# Patient Record
Sex: FEMALE | Race: WHITE | NOT HISPANIC OR LATINO | Employment: FULL TIME | URBAN - METROPOLITAN AREA
[De-identification: names, ages, dates, MRNs, and addresses within clinical notes are randomized per-mention and may not be internally consistent; named-entity substitution may affect disease eponyms.]

---

## 2017-10-05 ENCOUNTER — GENERIC CONVERSION - ENCOUNTER (OUTPATIENT)
Dept: OTHER | Facility: OTHER | Age: 22
End: 2017-10-05

## 2017-11-24 ENCOUNTER — ALLSCRIPTS OFFICE VISIT (OUTPATIENT)
Dept: OTHER | Facility: OTHER | Age: 22
End: 2017-11-24

## 2017-11-25 NOTE — PROGRESS NOTES
Assessment    1  Encounter for annual routine gynecological examination (V72 31) (Z01 419)   2  Screening for STDs (sexually transmitted diseases) (V74 5) (Z11 3)   3  Contraception management (V25 9) (Z30 9)    Plan  Contraception management    · NuvaRing 0 12-0 015 MG/24HR Vaginal Ring; INSERT 1 RING VAGINALLY FOR3 WEEKS THEN 1 WEEK OFF   Rx By: Neeta Wong; Dispense: 0 Days ; #:1 X 1 Ring Box; Refill: 11; Contraception management; SHANE = N; Verified Transmission to [id number]; Last Updated By: [employer]; 11/24/2017 2:01:16 PM  Screening for STDs (sexually transmitted diseases)    · (Q) CHLAMYDIA/N  GONORRHOEAE DNA, SDA; Status:Active; Requestedfor:24Nov2017;    Perform:[employer]; WUP:[id number]; Ordered;for STDs (sexually transmitted diseases); Ordered By:Kerline Kim;    Discussion/Summary  cervical cancer screening is current Testing was done today for chlamydia and gonorrhea  Breast cancer screening: the risks and benefits of breast cancer screening were discussed, self breast exam technique was taught and monthly self breast exam was advised  Advice and education were given regarding aerobic exercise and weight bearing exercise  Contraception-discussed various options including birth control pills, the patch, Nexplanon, Depo-Provera  She is interested in C/ [place] 9  The pamphlet was given for her to review and also I gave her a pamphlet on NuvaRing and highlighted the instructions  She will restart NuvaRing with her next period, she will use condoms if she is sexually active and she will call us if she wants to proceed with Nexplanon  and gonorrhea done today  The patient has the current Goals: See discussion  The patent has the current Barriers: None  Patient is able to Self-Care  Possible side effects of new medications were reviewed with the patient/guardian today  The treatment plan was reviewed with the patient/guardian   The patient/guardian understands and agrees with the treatment plan      Chief Complaint    1  Visit For: Preventive General Multisystem Exam    History of Present Illness  HPI: Pt here for yearly, she has been on NuvaRing  This past month she forgot to reinsert her new ring because she was very busy with school  She has not been sexually active this semester  Last year she was inserting her new for ring late because she was waiting for her menstrual cycle to end, I reviewed the correct use at that time  She is interested in another form of contraception because occasionally the NuvaRing falls out when she lifts weights or does other strenuous exercise  She was also treated for Chlamydia last year and had a normal test of cure  GYN HM, Adult Female [facility], [place]: The patient is being seen for a health maintenance evaluation  The last health maintenance visit was 1 year(s) ago  General Health: The patient's health since the last visit is described as good  Lifestyle:  She does not have any weight concerns  -- She exercises regularly  -- She does not use tobacco   Reproductive health: the patient is premenopausal--   she reports no menstrual problems  -- she uses contraception  For contraception, she uses the intravaginal ring -- she is not sexually active  -- she denies prior pregnancies  Screening:      Review of Systems   Constitutional: No fever, no chills, feels well, no tiredness, no recent weight gain or loss  ENT: no ear ache, no loss of hearing, no nosebleeds or nasal discharge, no sore throat or hoarseness  Cardiovascular: no complaints of slow or fast heart rate, no chest pain, no palpitations, no leg claudication or lower extremity edema  Respiratory: no complaints of shortness of breath, no wheezing, no dyspnea on exertion, no orthopnea or PND  Breasts: no complaints of breast pain, breast lump or nipple discharge  Gastrointestinal: no complaints of abdominal pain, no constipation, no nausea or diarrhea, no vomiting, no bloody stools    Genitourinary: no complaints of dysuria, no incontinence, no pelvic pain, no dysmenorrhea, no vaginal discharge or abnormal vaginal bleeding  Musculoskeletal: no complaints of arthralgia, no myalgia, no joint swelling or stiffness, no limb pain or swelling  Integumentary: no complaints of skin rash or lesion, no itching or dry skin, no skin wounds  Neurological: no complaints of headache, no confusion, no numbness or tingling, no dizziness or fainting  Active Problems  1  Chlamydia (079 98) (A74 9)   2  Contraception management (V2 9) (Z30 9)   3  Encounter for annual routine gynecological examination () (Z01 419)   4  Encounter for initial prescription of contraceptives () (Z30 019)   5  Denied: History of self breast exam   6  Oral contraceptive pill surveillance () (Z30 41)   7  Screening for STDs (sexually transmitted diseases) (V74 5) (Z11 3)    Past Medical History   · History of  0 (V49 89)   · Denied: History of self breast exam    The active problems and past medical history were reviewed and updated today  Surgical History   · History of Oral Surgery Tooth Extraction [place] To[facility]    The surgical history was reviewed and updated today  Family History  Mother    · No pertinent family history  Father    · No pertinent family history    The family history was reviewed and updated today  Social History     · Always uses seat belt   · Caffeine use (V49 89) (F15 90)   · Has no children   · Never a smoker   · No drug use   · Faith   · Single   · Social alcohol use (Z78 9)  The social history was reviewed and updated today  Current Meds   1  Detrol 1 MG Oral Tablet; Therapy: (Recorded:2017) to Recorded   2  NuvaRing 0 12-0 015 MG/24HR Vaginal Ring; INSERT 1 RING VAGINALLY FOR 3 WEEKS THEN 1 WEEK OFF; Therapy: [id number] to (Evaluate:58Fmo8409)  Requested for: 54MNI6577; Last Rx:2017 Ordered    Allergies  1   Augmentin TABS    Vitals   Recorded: [id number] 01:25PM Systolic 001, LUE, Sitting   Diastolic 62, LUE, Sitting   Height 4 ft 4 5 in   Weight 113 lb 6 oz   BMI Calculated 28 92   BSA Calculated 1 33   LMP [id number]       Physical Exam   Constitutional  General appearance: No acute distress, well appearing and well nourished  Neck  Thyroid: Normal, no thyromegaly  Pulmonary  Auscultation of lungs: Clear to auscultation  Cardiovascular  Auscultation of heart: Normal rate and rhythm, normal S1 and S2, no murmurs  Genitourinary  External genitalia: Normal and no lesions appreciated  Vagina: Normal, no lesions or dryness appreciated  Urethra: Normal    Urethral meatus: Normal    Bladder: Normal, soft, non-tender and no prolapse or masses appreciated  Cervix: Normal, no palpable masses  Uterus: Normal, non-tender, not enlarged, and no palpable masses  Adnexa/parametria: Normal, non-tender and no fullness or masses appreciated  Chest  Breasts: Normal and no dimpling or skin changes noted  Abdomen  Abdomen: Normal, non-tender, and no organomegaly noted  Liver and spleen: No hepatomegaly or splenomegaly  Examination for hernias: No hernias appreciated  Psychiatric  Orientation to person, place, and time: Normal    Mood and affect: Normal        Signatures   Electronically signed by :  Blayne Anderson DO; Nov 24 2017  3:19PM EST                       (Author)

## 2017-11-27 LAB — CLINICAL COMMENT (HISTORICAL): NORMAL

## 2017-11-29 ENCOUNTER — GENERIC CONVERSION - ENCOUNTER (OUTPATIENT)
Dept: OTHER | Facility: OTHER | Age: 22
End: 2017-11-29

## 2018-01-09 NOTE — MISCELLANEOUS
Message   Recorded as Task   Date: 10/17/2016 10:37 AM, Created By: Shelly Bobo   Task Name: Review Document   Assigned To: Delaney Hassan   Regarding Patient: Michael Marrero, Status: In Progress   Comment:    Shelly Bobo - 17 Oct 2016 10:37 AM     TASK CREATED  pT WITH + CHLAMYDIA TEST, treat with 1gm azithromycin and then come back for repeat test, her partner needs to be treated also  Effie Menjivar - 18 Oct 2016 12:20 PM     TASK IN PROGRESS        Active Problems    1  Encounter for annual routine gynecological examination (V72 31) (Z01 419)   2  Encounter for initial prescription of contraceptives (V25 02) (Z30 019)   3  Denied: History of self breast exam   4  Oral contraceptive pill surveillance (V25 41) (Z30 41)   5  Screening for STDs (sexually transmitted diseases) (V74 5) (Z11 3)    Current Meds   1  NuvaRing 0 12-0 015 MG/24HR Vaginal Ring; INSERT 1 RING VAGINALLY FOR 3   WEEKS THEN 1 WEEK OFF; Therapy: [id number] to (Last Rx:07Oct2016)  Requested for: 07Oct2016 Ordered    Allergies    1   Augmentin TABS    Signatures   Electronically signed by : Ishan Huber, ; Oct 20 2016  8:53AM EST                       (Author)

## 2018-01-12 NOTE — MISCELLANEOUS
Message   Date: 05 Oct 2017 3:49 PM EST, Recorded By: Osmar Cai For: Chester Lino, Self   Phone: [phone] (Home)   Reason: Medical Complaint   pt has been having acute UTI's for 5 years  Georgette Ormond advised pt to see her PCP about this           Active Problems    1  Chlamydia (079 98) (A74 9)   2  Contraception management (V25 9) (Z30 9)   3  Encounter for annual routine gynecological examination (V72 31) (Z01 419)   4  Encounter for initial prescription of contraceptives (V25 02) (Z30 019)   5  Denied: History of self breast exam   6  Oral contraceptive pill surveillance (V25 41) (Z30 41)   7  Screening for STDs (sexually transmitted diseases) (V74 5) (Z11 3)    Current Meds   1  NuvaRing 0 12-0 015 MG/24HR Vaginal Ring; INSERT 1 RING VAGINALLY FOR 3   WEEKS THEN 1 WEEK OFF; Therapy: [id number] to (Last Rx:25Nov2016)  Requested for: 25Nov2016 Ordered    Allergies    1   Augmentin TABS    Plan  Contraception management    · NuvaRing 0 12-0 015 MG/24HR Vaginal Ring; INSERT 1 RING VAGINALLY FOR  3 WEEKS THEN 1 WEEK OFF    Signatures   Electronically signed by : Cally Alexis, ; Oct  5 2017  3:49PM EST                       (Author)

## 2018-01-13 NOTE — MISCELLANEOUS
Message   Recorded as Task   Date: 12/01/2016 08:39 AM, Created By: Toro Perez   Task Name: Review Document   Assigned To: Ivelisse Easley   Regarding Patient: Ching Donohue, Status: In Progress   Comment:    Toro Perez - 01 Dec 2016 8:39 AM     TASK CREATED  negative chlamydia and gonorrhea, please reinforce that she needs to inform her previous partner that she had chlamydia and he needs to be checked  Mira Sanchez - 01 Dec 2016 3:52 PM     TASK IN PROGRESS        Active Problems    1  Chlamydia (079 98) (A74 9)   2  Contraception management (V25 9) (Z30 9)   3  Encounter for annual routine gynecological examination (V72 31) (Z01 419)   4  Encounter for initial prescription of contraceptives (V25 02) (Z30 019)   5  Denied: History of self breast exam   6  Oral contraceptive pill surveillance (V25 41) (Z30 41)   7  Screening for STDs (sexually transmitted diseases) (V74 5) (Z11 3)    Current Meds   1  NuvaRing 0 12-0 015 MG/24HR Vaginal Ring; INSERT 1 RING VAGINALLY FOR 3   WEEKS THEN 1 WEEK OFF; Therapy: [id number] to (Last Rx:25Nov2016)  Requested for: 25Nov2016 Ordered    Allergies    1   Augmentin TABS    Signatures   Electronically signed by : Griselda Shiley, ; Dec 12 2016  9:52AM EST                       (Author)

## 2018-01-14 VITALS
DIASTOLIC BLOOD PRESSURE: 62 MMHG | SYSTOLIC BLOOD PRESSURE: 104 MMHG | HEIGHT: 55 IN | BODY MASS INDEX: 26.24 KG/M2 | WEIGHT: 113.38 LBS

## 2018-01-14 NOTE — MISCELLANEOUS
Message   Recorded as Task   Date: 11/28/2017 04:40 PM, Created By: Leif Manuel   Task Name: Go to Result   Assigned To: Mario Hendricks   Regarding Patient: Kristina Bronson, Status: Active   Comment:    Leif Manuel - 28 Nov 2017 4:40 PM     TASK CREATED  nml chlamydia and gonorrhea   Mira Sanchez - 29 Nov 2017 12:44 PM     TASK EDITED  LM on pts VM with results        Active Problems    1  Chlamydia (079 98) (A74 9)   2  Contraception management (V25 9) (Z30 9)   3  Encounter for annual routine gynecological examination (V72 31) (Z01 419)   4  Encounter for initial prescription of contraceptives (V25 02) (Z30 019)   5  Denied: History of self breast exam   6  Oral contraceptive pill surveillance (V25 41) (Z30 41)   7  Screening for STDs (sexually transmitted diseases) (V74 5) (Z11 3)    Current Meds   1  Detrol 1 MG Oral Tablet (Tolterodine Tartrate); Therapy: (Recorded:24Nov2017) to Recorded   2  NuvaRing 0 12-0 015 MG/24HR Vaginal Ring; INSERT 1 RING VAGINALLY FOR 3   WEEKS THEN 1 WEEK OFF; Therapy: [id number] to (Last Rx:24Nov2017)  Requested for: 63CYG7792 Ordered    Allergies    1   Augmentin TABS    Signatures   Electronically signed by : Kayla Lees, ; Nov 29 2017 12:45PM EST                       (Author)

## 2018-01-15 NOTE — MISCELLANEOUS
Message   Date: 24 Oct 2016 4:19 PM EST, Recorded By: Mis Zuniga For: Tony Alfonso Ma, Self   Phone: [phone] (Home)   Reason: Other   pt will scheule test of cure in 3- 4 weeks    script called in for the medication to [facility] [place]           Active Problems    1  Encounter for annual routine gynecological examination (V72 31) (Z01 419)   2  Encounter for initial prescription of contraceptives (V25 02) (Z30 019)   3  Denied: History of self breast exam   4  Oral contraceptive pill surveillance (V25 41) (Z30 41)   5  Screening for STDs (sexually transmitted diseases) (V74 5) (Z11 3)    Current Meds   1  NuvaRing 0 12-0 015 MG/24HR Vaginal Ring; INSERT 1 RING VAGINALLY FOR 3   WEEKS THEN 1 WEEK OFF; Therapy: [id number] to (Last Rx:07Oct2016)  Requested for: 07Oct2016 Ordered    Allergies    1   Augmentin TABS    Signatures   Electronically signed by : Kira Ortega, ; Oct 24 2016  4:20PM EST                       (Author)

## 2018-01-18 NOTE — MISCELLANEOUS
Message   Recorded as Task   Date: 08/19/2016 10:51 AM, Created By: Car Collier   Task Name: Miscellaneous   Assigned To: Effie Menjivar   Regarding Patient: Sherilyn Denver, Status: Active   CommentKris Gonzalez - 19 Aug 2016 10:51 AM     TASK CREATED  Patient called again and left more information  She is c/o ongoing irritation with Nuva Ring  She is thinking she wants Paragard but initially she wants an appointment for a birth control discussion  I will call her to schedule this  You may disregard the 2 tasks about her  Kristine Griffithnn - 19 Aug 2016 12:57 PM     TASK REPLIED TO: Previously Assigned To Kristine Aj  thanks        Active Problems    1  Encounter for annual routine gynecological examination (V72 31) (Z01 419)   2  Encounter for initial prescription of contraceptives (V25 02) (Z30 019)   3  Denied: History of self breast exam    Current Meds   1  NuvaRing 0 12-0 015 MG/24HR Vaginal Ring; INSERT 1 RING VAGINALLY FOR 3   WEEKS THEN 1 WEEK OFF; Therapy: [id number] to (Last Rx:[id number])  Requested for: [id number] Ordered    Allergies    1   Augmentin TABS    Signatures   Electronically signed by : Mercedes Carlisle, ; Aug 19 2016  3:12PM EST                       (Author)

## 2018-11-23 ENCOUNTER — ANNUAL EXAM (OUTPATIENT)
Dept: OBGYN CLINIC | Facility: CLINIC | Age: 23
End: 2018-11-23

## 2018-11-23 VITALS
BODY MASS INDEX: 23.09 KG/M2 | WEIGHT: 110 LBS | SYSTOLIC BLOOD PRESSURE: 110 MMHG | HEIGHT: 58 IN | DIASTOLIC BLOOD PRESSURE: 80 MMHG

## 2018-11-23 DIAGNOSIS — Z11.3 SCREENING FOR STD (SEXUALLY TRANSMITTED DISEASE): Primary | ICD-10-CM

## 2018-11-23 DIAGNOSIS — Z12.4 SCREENING FOR CERVICAL CANCER: ICD-10-CM

## 2018-11-23 DIAGNOSIS — Z01.419 ENCOUNTER FOR ANNUAL ROUTINE GYNECOLOGICAL EXAMINATION: ICD-10-CM

## 2018-11-23 DIAGNOSIS — Z30.016 ENCOUNTER FOR INITIAL PRESCRIPTION OF TRANSDERMAL PATCH HORMONAL CONTRACEPTIVE DEVICE: ICD-10-CM

## 2018-11-23 PROCEDURE — 99395 PREV VISIT EST AGE 18-39: CPT | Performed by: OBSTETRICS & GYNECOLOGY

## 2018-11-23 PROCEDURE — G0145 SCR C/V CYTO,THINLAYER,RESCR: HCPCS | Performed by: OBSTETRICS & GYNECOLOGY

## 2018-11-23 PROCEDURE — 87491 CHLMYD TRACH DNA AMP PROBE: CPT | Performed by: OBSTETRICS & GYNECOLOGY

## 2018-11-23 PROCEDURE — 87591 N.GONORRHOEAE DNA AMP PROB: CPT | Performed by: OBSTETRICS & GYNECOLOGY

## 2018-11-23 RX ORDER — TOLTERODINE TARTRATE 1 MG/1
TABLET, EXTENDED RELEASE ORAL
COMMUNITY

## 2018-11-23 NOTE — PROGRESS NOTES
Assessment/Plan:    Contraception - reviewed several options with her and also her mother  Discussed ocs, contraception patch, nuvaring, Nexplanon, Depo provera and IUD  She is considering nexplanon  She was given information on this to review  She will try the patch for now while she is deciding and will call if she wants Nexplanon  Pap, gc and chlamydia done today    No problem-specific Assessment & Plan notes found for this encounter  Diagnoses and all orders for this visit:    Screening for STD (sexually transmitted disease)  -     Chlamydia/GC amplified DNA by PCR    Encounter for annual routine gynecological examination    Encounter for initial prescription of transdermal patch hormonal contraceptive device  -     norelgestromin-ethinyl estradiol (ORTHO EVRA) 150-35 MCG/24HR; Place 1 patch on the skin once a week    Screening for cervical cancer  -     Liquid-based pap, screening    Other orders  -     tolterodine (DETROL) 1 mg tablet; Take by mouth          Subjective:      Patient ID: Sharan Thurston is a 26 y o  female  Pt here for yearly  She has been using Nuvaring but it was falling out frequently  The last 2 months, she did not seem to have a problem with this  She is interested in something else for contraception, possibly Nexplanon  She is due for a pap  The following portions of the patient's history were reviewed and updated as appropriate: allergies, current medications, past family history, past medical history, past social history, past surgical history and problem list     Review of Systems   Constitutional: Negative  HENT: Negative  Eyes: Negative  Respiratory: Negative  Cardiovascular: Negative  Gastrointestinal: Negative  Endocrine: Negative  Genitourinary: Negative  Musculoskeletal: Negative  Skin: Negative  Allergic/Immunologic: Negative  Neurological: Negative  Hematological: Negative  Psychiatric/Behavioral: Negative  Objective:      /80 (BP Location: Left arm, Patient Position: Sitting, Cuff Size: Standard)   Ht 4' 10" (1 473 m)   Wt 49 9 kg (110 lb)   LMP 11/14/2018 (Exact Date)   Breastfeeding? No   BMI 22 99 kg/m²          Physical Exam   Constitutional: She appears well-developed  Neck: No tracheal deviation present  No thyromegaly present  Cardiovascular: Normal rate and regular rhythm  Pulmonary/Chest: Effort normal and breath sounds normal  Right breast exhibits no inverted nipple, no mass, no nipple discharge, no skin change and no tenderness  Left breast exhibits no inverted nipple, no mass, no nipple discharge, no skin change and no tenderness  Breasts are symmetrical    Examined seated and supine   Abdominal: Soft  She exhibits no distension and no mass  There is no tenderness  Genitourinary: Vagina normal and uterus normal  No labial fusion  There is no rash, tenderness, lesion or injury on the right labia  There is no rash, tenderness, lesion or injury on the left labia  Cervix exhibits no motion tenderness, no discharge and no friability  Right adnexum displays no mass, no tenderness and no fullness  Left adnexum displays no mass, no tenderness and no fullness  Vitals reviewed

## 2018-11-26 LAB
C TRACH DNA SPEC QL NAA+PROBE: NEGATIVE
N GONORRHOEA DNA SPEC QL NAA+PROBE: NEGATIVE

## 2018-12-02 LAB
LAB AP GYN PRIMARY INTERPRETATION: NORMAL
Lab: NORMAL

## 2018-12-06 ENCOUNTER — TELEPHONE (OUTPATIENT)
Dept: OBGYN CLINIC | Facility: CLINIC | Age: 23
End: 2018-12-06

## 2018-12-06 NOTE — TELEPHONE ENCOUNTER
----- Message from Aide Hernández DO sent at 12/6/2018 12:28 PM EST -----  Normal Pap and negative chlamydia and gonorrhea

## 2019-03-28 ENCOUNTER — TELEPHONE (OUTPATIENT)
Dept: OBGYN CLINIC | Facility: CLINIC | Age: 24
End: 2019-03-28

## 2019-03-29 ENCOUNTER — TELEPHONE (OUTPATIENT)
Dept: OBGYN CLINIC | Facility: CLINIC | Age: 24
End: 2019-03-29

## 2019-04-22 ENCOUNTER — PROCEDURE VISIT (OUTPATIENT)
Dept: OBGYN CLINIC | Facility: CLINIC | Age: 24
End: 2019-04-22
Payer: COMMERCIAL

## 2019-04-22 VITALS
SYSTOLIC BLOOD PRESSURE: 104 MMHG | DIASTOLIC BLOOD PRESSURE: 64 MMHG | WEIGHT: 115.4 LBS | BODY MASS INDEX: 23.26 KG/M2 | HEIGHT: 59 IN

## 2019-04-22 DIAGNOSIS — Z30.017 INSERTION OF NEXPLANON: Primary | ICD-10-CM

## 2019-04-22 LAB — SL AMB POCT URINE HCG: NEGATIVE

## 2019-04-22 PROCEDURE — 11981 INSERTION DRUG DLVR IMPLANT: CPT | Performed by: OBSTETRICS & GYNECOLOGY

## 2019-04-22 PROCEDURE — 81025 URINE PREGNANCY TEST: CPT | Performed by: OBSTETRICS & GYNECOLOGY

## 2019-04-22 RX ORDER — ESCITALOPRAM OXALATE 5 MG/1
5 TABLET ORAL
COMMUNITY
Start: 2019-04-19 | End: 2019-07-18

## 2019-11-29 ENCOUNTER — ANNUAL EXAM (OUTPATIENT)
Dept: OBGYN CLINIC | Facility: CLINIC | Age: 24
End: 2019-11-29
Payer: COMMERCIAL

## 2019-11-29 VITALS
BODY MASS INDEX: 25.51 KG/M2 | DIASTOLIC BLOOD PRESSURE: 68 MMHG | HEIGHT: 56 IN | WEIGHT: 113.4 LBS | SYSTOLIC BLOOD PRESSURE: 98 MMHG

## 2019-11-29 DIAGNOSIS — Z01.419 ENCOUNTER FOR ANNUAL ROUTINE GYNECOLOGICAL EXAMINATION: Primary | ICD-10-CM

## 2019-11-29 PROCEDURE — 99395 PREV VISIT EST AGE 18-39: CPT | Performed by: OBSTETRICS & GYNECOLOGY

## 2019-11-29 NOTE — PROGRESS NOTES
Assessment/Plan:    pap is up to date    Discussed self breast exams    Contraception-she will continue with Nexplanon  She will call with any questions or concerns  discussed preventive care, regular exercise and a healthy diet      No problem-specific Assessment & Plan notes found for this encounter  Diagnoses and all orders for this visit:    Encounter for annual routine gynecological examination    Other orders  -     Multiple Vitamins-Minerals (MULTIVITAMIN ADULT EXTRA C PO); Take by mouth          Subjective:      Patient ID: William Montes is a 27 y o  female  Patient here for yearly  She had a Nexplanon placed in April  Regular menses with very light flow, no midcycle bleeding  She is happy with this  She is not sexually active  Normal Pap and negative chlamydia and gonorrhea done in November 2018  The following portions of the patient's history were reviewed and updated as appropriate: allergies, current medications, past family history, past medical history, past social history, past surgical history and problem list     Review of Systems   Constitutional: Negative  Gastrointestinal: Negative  Genitourinary: Negative  Objective: There were no vitals taken for this visit  Physical Exam   Constitutional: She appears well-developed  Neck: No tracheal deviation present  No thyromegaly present  Cardiovascular: Normal rate and regular rhythm  Pulmonary/Chest: Effort normal and breath sounds normal  Right breast exhibits no inverted nipple, no mass, no nipple discharge, no skin change and no tenderness  Left breast exhibits no inverted nipple, no mass, no nipple discharge, no skin change and no tenderness  Breasts are symmetrical    Examined seated and supine   Abdominal: Soft  She exhibits no distension and no mass  There is no tenderness  Genitourinary: Vagina normal and uterus normal  No labial fusion   There is no rash, tenderness, lesion or injury on the right labia  There is no rash, tenderness, lesion or injury on the left labia  Cervix exhibits no motion tenderness, no discharge and no friability  Right adnexum displays no mass, no tenderness and no fullness  Left adnexum displays no mass, no tenderness and no fullness  Skin:   Nexplanon palpated in her upper left arm  Vitals reviewed

## 2020-12-03 ENCOUNTER — ANNUAL EXAM (OUTPATIENT)
Dept: OBGYN CLINIC | Facility: CLINIC | Age: 25
End: 2020-12-03
Payer: COMMERCIAL

## 2020-12-03 VITALS
BODY MASS INDEX: 22.78 KG/M2 | DIASTOLIC BLOOD PRESSURE: 80 MMHG | HEIGHT: 59 IN | SYSTOLIC BLOOD PRESSURE: 118 MMHG | WEIGHT: 113 LBS

## 2020-12-03 DIAGNOSIS — Z01.419 ENCOUNTER FOR ANNUAL ROUTINE GYNECOLOGICAL EXAMINATION: Primary | ICD-10-CM

## 2020-12-03 PROCEDURE — 99395 PREV VISIT EST AGE 18-39: CPT | Performed by: OBSTETRICS & GYNECOLOGY

## 2021-12-16 ENCOUNTER — ANNUAL EXAM (OUTPATIENT)
Dept: OBGYN CLINIC | Facility: CLINIC | Age: 26
End: 2021-12-16
Payer: COMMERCIAL

## 2021-12-16 VITALS
DIASTOLIC BLOOD PRESSURE: 74 MMHG | SYSTOLIC BLOOD PRESSURE: 110 MMHG | HEIGHT: 59 IN | WEIGHT: 112 LBS | BODY MASS INDEX: 22.58 KG/M2

## 2021-12-16 DIAGNOSIS — Z30.09 GENERAL COUNSELING AND ADVICE ON FEMALE CONTRACEPTION: ICD-10-CM

## 2021-12-16 DIAGNOSIS — Z01.419 ENCOUNTER FOR ANNUAL ROUTINE GYNECOLOGICAL EXAMINATION: Primary | ICD-10-CM

## 2021-12-16 PROCEDURE — 87591 N.GONORRHOEAE DNA AMP PROB: CPT | Performed by: OBSTETRICS & GYNECOLOGY

## 2021-12-16 PROCEDURE — 87491 CHLMYD TRACH DNA AMP PROBE: CPT | Performed by: OBSTETRICS & GYNECOLOGY

## 2021-12-16 PROCEDURE — G0145 SCR C/V CYTO,THINLAYER,RESCR: HCPCS | Performed by: OBSTETRICS & GYNECOLOGY

## 2021-12-16 PROCEDURE — 99395 PREV VISIT EST AGE 18-39: CPT | Performed by: OBSTETRICS & GYNECOLOGY

## 2021-12-17 ENCOUNTER — TELEPHONE (OUTPATIENT)
Dept: OBGYN CLINIC | Facility: CLINIC | Age: 26
End: 2021-12-17

## 2021-12-20 LAB
C TRACH DNA SPEC QL NAA+PROBE: NEGATIVE
N GONORRHOEA DNA SPEC QL NAA+PROBE: NEGATIVE

## 2021-12-30 LAB
LAB AP GYN PRIMARY INTERPRETATION: NORMAL
Lab: NORMAL

## 2022-03-14 ENCOUNTER — TELEPHONE (OUTPATIENT)
Dept: OBGYN CLINIC | Facility: CLINIC | Age: 27
End: 2022-03-14

## 2022-03-14 NOTE — TELEPHONE ENCOUNTER
----- Message from Paul Gabriel sent at 12/17/2021 11:16 AM EST -----  Regarding: preauth nexplanon  Please preaut nexplanon    make sure her insurance isn't different  Alma Patel appt in April  Alma Patel

## 2022-04-14 ENCOUNTER — PROCEDURE VISIT (OUTPATIENT)
Dept: OBGYN CLINIC | Facility: CLINIC | Age: 27
End: 2022-04-14
Payer: COMMERCIAL

## 2022-04-14 VITALS
DIASTOLIC BLOOD PRESSURE: 84 MMHG | WEIGHT: 110 LBS | SYSTOLIC BLOOD PRESSURE: 114 MMHG | HEIGHT: 59 IN | BODY MASS INDEX: 22.18 KG/M2

## 2022-04-14 DIAGNOSIS — Z30.46 ENCOUNTER FOR REMOVAL AND REINSERTION OF NEXPLANON: Primary | ICD-10-CM

## 2022-04-14 PROCEDURE — 11983 REMOVE/INSERT DRUG IMPLANT: CPT | Performed by: OBSTETRICS & GYNECOLOGY

## 2022-04-14 NOTE — PROGRESS NOTES
Universal Protocol:  Consent: Written consent obtained  Risks and benefits: risks, benefits and alternatives were discussed  Consent given by: patient  Time out: Immediately prior to procedure a "time out" was called to verify the correct patient, procedure, equipment, support staff and site/side marked as required  Patient understanding: patient states understanding of the procedure being performed  Patient consent: the patient's understanding of the procedure matches consent given  Procedure consent: procedure consent matches procedure scheduled  Relevant documents: relevant documents present and verified  Required items: required blood products, implants, devices, and special equipment available  Patient identity confirmed: verbally with patient    Remove and insert drug implant    Date/Time: 4/14/2022 3:56 PM  Performed by: Chiquita Caballero MD  Authorized by:  Chiquita Caballero MD     Indication:     Indication: Presence of non-biodegradable drug delivery implant    Pre-procedure:     Pre-procedure timeout performed: yes      Prepped with: povidone-iodine      Local anesthetic:  Lidocaine 1%    The site was cleaned and prepped in a sterile fashion: yes    Procedure:     Procedure:  Removal with reinsertion    Small stab incision was made in arm: yes      Left/right:  Left    Preloaded contraceptive capsule trocar was placed subdermally: yes      Visualization of implant was obtained: yes      Contraceptive capsule was inserted and trocar removed: yes      Visualization of notch in stylet and palpation of device: yes      Palpation confirms placement by provider and patient: yes      Site was closed with steri-strips and pressure bandage applied: yes

## 2022-12-29 ENCOUNTER — ANNUAL EXAM (OUTPATIENT)
Dept: GYNECOLOGY | Facility: CLINIC | Age: 27
End: 2022-12-29

## 2022-12-29 VITALS
HEIGHT: 59 IN | BODY MASS INDEX: 22.66 KG/M2 | SYSTOLIC BLOOD PRESSURE: 120 MMHG | DIASTOLIC BLOOD PRESSURE: 70 MMHG | WEIGHT: 112.4 LBS

## 2022-12-29 DIAGNOSIS — Z01.419 ENCOUNTER FOR ANNUAL ROUTINE GYNECOLOGICAL EXAMINATION: Primary | ICD-10-CM

## 2022-12-29 NOTE — PROGRESS NOTES
Assessment/Plan:    pap is up to date    Contraception - she is happy with her Nexplanon    Discussed self breast exams    She just got engaged, is not interested in pregnancy for another 2 to 3 years but has some questions about the exercise she does  I explained that some of the moves that she does with [employer] silks would not be advisable in pregnancy  At that time, she talk to her obstetrician for more specific restrictions  She has some questions about pelvic floor PT and I gave her information on this in regards to pregnancy  discussed preventive care, regular exercise and a healthy diet      No problem-specific Assessment & Plan notes found for this encounter  Diagnoses and all orders for this visit:    Encounter for annual routine gynecological examination          Subjective:      Patient ID: Rima Lopez is a 24 y o  female  Patient here for yearly  She has a Nexplanon which was placed for this  At that time, she had her old Nexplanon removed and the new one inserted  She has a cycle every 2-3 months, light in flow  She is happy with this    Normal Pap, negative chlamydia and gonorrhea in December 2021    She recently became engaged! The following portions of the patient's history were reviewed and updated as appropriate: allergies, current medications, past family history, past medical history, past social history, past surgical history and problem list     Review of Systems   Constitutional: Negative  Gastrointestinal: Negative  Genitourinary: Negative  Objective:      /70 (BP Location: Left arm, Patient Position: Sitting, Cuff Size: Standard)   Ht 4' 10 5" (1 486 m)   Wt 51 kg (112 lb 6 4 oz)   LMP 12/17/2022 (Approximate)   BMI 23 09 kg/m²          Physical Exam  Vitals reviewed  Constitutional:       Appearance: She is well-developed  Neck:      Thyroid: No thyromegaly  Trachea: No tracheal deviation     Cardiovascular:      Rate and Rhythm: Normal rate and regular rhythm  Pulmonary:      Effort: Pulmonary effort is normal       Breath sounds: Normal breath sounds  Chest:   Breasts:     Breasts are symmetrical       Right: No inverted nipple, mass, nipple discharge, skin change or tenderness  Left: No inverted nipple, mass, nipple discharge, skin change or tenderness  Abdominal:      General: There is no distension  Palpations: Abdomen is soft  There is no mass  Tenderness: There is no abdominal tenderness  Genitourinary:     Labia:         Right: No rash, tenderness, lesion or injury  Left: No rash, tenderness, lesion or injury  Vagina: Normal       Cervix: No cervical motion tenderness, discharge or friability  Adnexa:         Right: No mass, tenderness or fullness  Left: No mass, tenderness or fullness       Musculoskeletal:        Arms:       Comments: Difficult to palpate Nexplanon,  patient can readily identify it's location, it is deeper than usual